# Patient Record
Sex: MALE | Race: OTHER
[De-identification: names, ages, dates, MRNs, and addresses within clinical notes are randomized per-mention and may not be internally consistent; named-entity substitution may affect disease eponyms.]

---

## 2018-06-27 ENCOUNTER — HOSPITAL ENCOUNTER (INPATIENT)
Dept: HOSPITAL 74 - YASAS | Age: 53
LOS: 4 days | Discharge: HOME | End: 2018-07-01
Attending: SURGERY | Admitting: SURGERY
Payer: COMMERCIAL

## 2018-06-27 VITALS — BODY MASS INDEX: 21.5 KG/M2

## 2018-06-27 DIAGNOSIS — F10.230: Primary | ICD-10-CM

## 2018-06-27 DIAGNOSIS — N40.0: ICD-10-CM

## 2018-06-27 DIAGNOSIS — F32.9: ICD-10-CM

## 2018-06-27 DIAGNOSIS — E78.5: ICD-10-CM

## 2018-06-27 DIAGNOSIS — F17.213: ICD-10-CM

## 2018-06-27 DIAGNOSIS — K21.9: ICD-10-CM

## 2018-06-27 LAB
APPEARANCE UR: CLEAR
BILIRUB UR STRIP.AUTO-MCNC: NEGATIVE MG/DL
COLOR UR: COLORLESS
KETONES UR QL STRIP: NEGATIVE
LEUKOCYTE ESTERASE UR QL STRIP.AUTO: NEGATIVE
NITRITE UR QL STRIP: NEGATIVE
PH UR: 7 [PH] (ref 5–8)
PROT UR QL STRIP: NEGATIVE
PROT UR QL STRIP: NEGATIVE
SP GR UR: 1 (ref 1–1.03)
UROBILINOGEN UR STRIP-MCNC: NEGATIVE MG/DL (ref 0.2–1)

## 2018-06-27 PROCEDURE — HZ2ZZZZ DETOXIFICATION SERVICES FOR SUBSTANCE ABUSE TREATMENT: ICD-10-PCS | Performed by: SURGERY

## 2018-06-27 RX ADMIN — Medication SCH MG: at 22:31

## 2018-06-27 RX ADMIN — Medication PRN MG: at 22:31

## 2018-06-27 NOTE — HP
CIWA Score





- CIWA Score


Nausea/Vomitin-Mild Nausea/No Vomiting


Muscle Tremors: 3


Anxiety: 2


Agitation: 2


Paroxysmal Sweats: 3


Orientation: 0-Oriented


Tacttile Disturbances: 0-None


Auditory Disturbances: 0-None


Visual Disturbances: 0-None


Headache: 2-Mild


CIWA-Ar Total Score: 13





Admission ROS [facility]





- HPI


Chief Complaint: 





" I need help but I went sick when I do it by myself"


alcohol withdrawal symptoms 


Allergies/Adverse Reactions: 


 Allergies











Allergy/AdvReac Type Severity Reaction Status Date / Time


 


No Known Allergies Allergy   Verified 18 16:35











History of Present Illness: 





51 yo male with hx of nicotine and alcohol dependence is here seeking detox for 

the first time, denies any other prior hx of treatment. Reports hx of delirium 

tremors and hallucinations when with withdrawing from alcohol, reports hx of 

frequent blackouts with last episode two days ago. PMHX: BPH, GERD, 

hyperlipidemia, depression. Denies suicidal / homicidal ideation. Reports no 

significant period of sobriety. 


Exam Limitations: No Limitations





- Ebola screening


Have you traveled outside of the country in the last 21 days: No


Have you had contact with anyone from an Ebola affected area: No


Have you been sick,other than usual withdrawal symptoms: No


Do you have a fever: No





- Review of Systems


Constitutional: Chills, Loss of Appetite, Unintentional Wgt. Loss (5- 6 lbs)


EENT: reports: Cataracts (left eye sx), Dental Problems (missing teeth)


Respiratory: reports: No Symptoms reported


Cardiac: reports: No Symptoms Reported


GI: reports: Nausea, Poor Appetite, Poor Fluid Intake


: reports: See HPI


Musculoskeletal: reports: No Symptoms Reported


Integumentary: reports: No Symptoms Reported


Neuro: reports: See HPI, Weakness


Endocrine: reports: Increased Thirst


Hematology: reports: No Symptoms Reported


Psychiatric: reports: Orientated x3, Depressed


Other Systems: Reviewed and Negative





Patient History





- Patient Medical History


Hx Anemia: No


Hx Asthma: No


Hx Chronic Obstructive Pulmonary Disease (COPD): No


Hx Cancer: No


Hx Cardiac Disorders: No


Hx Congestive Heart Failure: No


Hx Hypertension: No


Hx Hypercholesterolemia: Yes


Hx Pacemaker: No


Hx Seizures: No


Hx Dementia: No


Hx Diabetes: No


Hx Gastrointestinal Disorders: Yes (GERD)


Hx Liver Disease: No


Hx Genitourinary Disorders: Yes (BPH)


Hx Sexually Transmitted Disorders: No


Hx Renal Disease (ESRD): No


Hx Thyroid Disease: No


Hx Human Immunodeficiency Virus (HIV): No (last tested 1 year)


Hx Hepatitis C: No


Hx Depression: Yes


Hx Suicide Attempt: No


Hx Bipolar Disorder: No


Hx Schizophrenia: No





- Patient Surgical History


Past Surgical History: Yes


Hx Neurologic Surgery: No


Hx Cataract Extraction: No


Hx Cardiac Surgery: No


Hx Lung Surgery: No


Hx Breast Surgery: No


Hx Breast Biopsy: No


Hx Abdominal Surgery: Yes (exploratory sx )


Hx Appendectomy: No


Hx Cholecystectomy: No


Hx Genitourinary Surgery: No


Hx Orthopedic Surgery: No


Anesthesia Reaction: No





- PPD History


Previous Implant?: No


Documented Results: Negative w/o proof


PPD to be Administered?: Yes





- Smoking Cessation


Smoking history: Current every day smoker


Have you smoked in the past 12 months: Yes


Aproximately how many cigarettes per day: 10


Hx Chewing Tobacco Use: No


Initiated information on smoking cessation: Yes


'Breaking Loose' booklet given: 18





- Substance & Tx. History


Hx Alcohol Use: Yes


Hx Substance Use: Yes


Substance Use Type: Alcohol


Hx Substance Use Treatment: No





- Substances Abused


  ** Alcohol


Route: Oral


Frequency: Daily


Amount used: 3 pints of Vodka 


Age of first use: 9


Date of Last Use: 17





Family Disease History





- Family Disease History


Family Disease History: Other: Father (, alzheimers ), Mother (alive, 

Osteoporosis, COPD )





Admission Physical Exam [facility]





- Vital Signs


Vital Signs: 


 Vital Signs - 24 hr











  18





  13:27


 


Temperature 98.2 F


 


Pulse Rate 84


 


Respiratory 20





Rate 


 


Blood Pressure 123/80














- Physical


General Appearance: Yes: Disheveled, Mild Distress, Thin, Tremorous, Sweating, 

Anxious


HEENTM: Yes: EOMI, Hearing grossly Normal, Normal ENT Inspection, Pharynx Normal

, Tm's normal


Respiratory: Yes: Chest Non-Tender, Lungs Clear, Normal Breath Sounds, No 

Respiratory Distress, No Accessory Muscle Use


Neck: Yes: Within Normal Limits


Breast: Yes: Breast Exam Deferred


Cardiology: Yes: Regular Rhythm, Regular Rate


Abdominal: Yes: Normal Bowel Sounds, Non Tender, Flat


Genitourinary: Yes: Within Normal Limits


Back: Yes: Normal Inspection


Musculoskeletal: Yes: full range of Motion, Gait Steady, Pelvis Stable


Extremities: Yes: Normal Capillary Refill, Normal Inspection, Normal Range of 

Motion, Non-Tender


Neurological: Yes: CNs II-XII NML intact, Fully Oriented, Alert, Motor Strength 

5/5, Depressed Affect


Integumentary: Yes: Normal Color, Dry, Warm, Diaphoresis


Lymphatic: Yes: Within Normal Limits





- Diagnostic


(1) Alcohol dependence with withdrawal


Current Visit: Yes   Status: Acute   


Qualifiers: 


   Complication of substance-induced condition: uncomplicated   Qualified Code(s

): F10.230 - Alcohol dependence with withdrawal, uncomplicated   





(2) BPH (benign prostatic hyperplasia)


Current Visit: Yes   Status: Chronic   





(3) Hyperlipidemia


Current Visit: Yes   Status: Chronic   





(4) Depressed mood


Current Visit: Yes   Status: Acute   





(5) GERD (gastroesophageal reflux disease)


Current Visit: Yes   Status: Chronic   


Qualifiers: 


   Esophagitis presence: esophagitis presence not specified   Qualified Code(s)

: K21.9 - Gastro-esophageal reflux disease without esophagitis   





Cleared for Admission S





- Detox or Rehab


[facility] Level of Care: Medically Managed


Detox Regimen/Protocol: Librium





BHS Breath Alcohol Content


Breath Alcohol Content: 0.108





Urine Drug Screen





- Results


Drug Screen Negative: Yes

## 2018-06-28 LAB
ALBUMIN SERPL-MCNC: 3.7 G/DL (ref 3.4–5)
ALP SERPL-CCNC: 94 U/L (ref 45–117)
ALT SERPL-CCNC: 42 U/L (ref 12–78)
ANION GAP SERPL CALC-SCNC: 7 MMOL/L (ref 8–16)
AST SERPL-CCNC: 35 U/L (ref 15–37)
BILIRUB SERPL-MCNC: 0.8 MG/DL (ref 0.2–1)
BUN SERPL-MCNC: 10 MG/DL (ref 7–18)
CALCIUM SERPL-MCNC: 8.8 MG/DL (ref 8.5–10.1)
CHLORIDE SERPL-SCNC: 105 MMOL/L (ref 98–107)
CO2 SERPL-SCNC: 29 MMOL/L (ref 21–32)
CREAT SERPL-MCNC: 0.9 MG/DL (ref 0.7–1.3)
DEPRECATED RDW RBC AUTO: 13.9 % (ref 11.9–15.9)
GLUCOSE SERPL-MCNC: 93 MG/DL (ref 74–106)
HCT VFR BLD CALC: 43.4 % (ref 35.4–49)
HGB BLD-MCNC: 14.1 GM/DL (ref 11.7–16.9)
MCH RBC QN AUTO: 29.4 PG (ref 25.7–33.7)
MCHC RBC AUTO-ENTMCNC: 32.4 G/DL (ref 32–35.9)
MCV RBC: 90.6 FL (ref 80–96)
PLATELET # BLD AUTO: 229 K/MM3 (ref 134–434)
PMV BLD: 10.3 FL (ref 7.5–11.1)
POTASSIUM SERPLBLD-SCNC: 4.1 MMOL/L (ref 3.5–5.1)
PROT SERPL-MCNC: 6.3 G/DL (ref 6.4–8.2)
RBC # BLD AUTO: 4.79 M/MM3 (ref 4–5.6)
SODIUM SERPL-SCNC: 141 MMOL/L (ref 136–145)
WBC # BLD AUTO: 10.1 K/MM3 (ref 4–10)

## 2018-06-28 RX ADMIN — IBUPROFEN PRN MG: 400 TABLET, FILM COATED ORAL at 10:35

## 2018-06-28 RX ADMIN — NICOTINE SCH MG: 14 PATCH, EXTENDED RELEASE TRANSDERMAL at 10:39

## 2018-06-28 RX ADMIN — Medication SCH TAB: at 10:35

## 2018-06-28 RX ADMIN — Medication PRN MG: at 22:11

## 2018-06-28 RX ADMIN — TAMSULOSIN HYDROCHLORIDE SCH MG: 0.4 CAPSULE ORAL at 10:35

## 2018-06-28 RX ADMIN — Medication SCH MG: at 22:11

## 2018-06-28 RX ADMIN — PANTOPRAZOLE SODIUM SCH MG: 20 TABLET, DELAYED RELEASE ORAL at 10:35

## 2018-06-28 RX ADMIN — ATORVASTATIN CALCIUM SCH MG: 40 TABLET, FILM COATED ORAL at 22:11

## 2018-06-28 NOTE — CONSULT
[facility]HS Psychiatric Consult





- Data


Date of interview: 06/28/18


Admission source: [facility]


Identifying data: This is 52 years old French speaking male,  father of 

three, unemployed, homeless, with no psychiatric hospitalization history,  with 

hx of nicotine and alcohol dependence is here seeking detox for the first time.

  Denies suicidal and homicidal history.


Substance Abuse History: Smoking history: Current every day smoker.  Have you 

smoked in the past 12 months: Yes.  Aproximately how many cigarettes per day: 

10.  Hx Chewing Tobacco Use: No.  Initiated information on smoking cessation: 

Yes.  'Breaking Loose' booklet given: 06/27/18.  - Substance & Tx. History.  Hx 

Alcohol Use: Yes.  Hx Substance Use: Yes.  Substance Use Type: Alcohol.  Hx 

Substance Use Treatment: No.  - Substances Abused.  ** Alcohol.  Route: Oral.  

Frequency: Daily.  Amount used: 3 pints of Vodka.  Age of first use: 9.  Date 

of Last Use: 06/27/18


Medical History: BPH, GERD, Hyperlipidemia


Psychiatric History: Patient reports history of depression, reports no 

medicationms taking prior to admikssion


Physical/Sexual Abuse/Trauma History: Denies


Additional Comment: Observation.  Detox Unit Care Protocol





Mental Status Exam





- Mental Status Exam


Alert and Oriented to: Person


Cognitive Function: Fair


Patient Appearance: Well Groomed


Mood: Anxious


Affect: Mood Congruent


Patient Behavior: Talkative


Speech Pattern: Appropriate


Voice Loudness: Normal


Thought Process: Goal Oriented


Thought Disorder: Being Controlled


Hallucinations: Denies


Suicidal Ideation: Denies


Homicidal Ideation: Denies


Insight/Judgement: Fair


Sleep: Difficulty falling asleep


Appetite: Weight loss


Muscle strength/Tone: Normal


Gait/Station: Normal


Additional Comments: Observation.  Detox Unit Care Protocol





Psychiatric Findings





- Problem List (Axis 1, 2,3)


(1) Nicotine dependence


Current Visit: Yes   Status: Acute   





(2) Alcohol dependence with withdrawal


Current Visit: Yes   Status: Acute   


Qualifiers: 


   Complication of substance-induced condition: uncomplicated   Qualified Code(s

): F10.230 - Alcohol dependence with withdrawal, uncomplicated   





(3) BPH (benign prostatic hyperplasia)


Current Visit: Yes   Status: Chronic   





(4) GERD (gastroesophageal reflux disease)


Current Visit: Yes   Status: Chronic   


Qualifiers: 


   Esophagitis presence: esophagitis presence not specified   Qualified Code(s)

: K21.9 - Gastro-esophageal reflux disease without esophagitis   





(5) Hyperlipidemia


Current Visit: Yes   Status: Chronic   





- Initial Treatment Plan


Initial Treatment Plan: Observation.  Detox Unit Care Protocol

## 2018-06-28 NOTE — PN
BHS CIWA





- CIWA Score


Nausea/Vomitin-Mild Nausea/No Vomiting


Muscle Tremors: 3


Anxiety: 2


Agitation: 2


Paroxysmal Sweats: 1-Minimal Palms Moist


Orientation: 0-Oriented


Tacttile Disturbances: 0-None


Auditory Disturbances: 0-None


Visual Disturbances: 0-None


Headache: 3-Moderate


CIWA-Ar Total Score: 12





BHS Progress Note (SOAP)


Subjective: 





sweat tremor headache gi distress trouble sleeping


Objective: 





18 10:14


 Vital Signs











Temperature  97.7 F   18 10:14


 


Pulse Rate  70   18 10:14


 


Respiratory Rate  18   18 10:14


 


Blood Pressure  133/83   18 10:14


 


O2 Sat by Pulse Oximetry (%)      








 Laboratory Last Values











Urine Color  Colorless   18  Unknown


 


Urine Appearance  Clear   18  Unknown


 


Urine pH  7.0  (5.0-8.0)   18  Unknown


 


Ur Specific Gravity  1.002  (1.001-1.035)   18  Unknown


 


Urine Protein  Negative  (NEGATIVE)   18  Unknown


 


Urine Glucose (UA)  Negative  (NEGATIVE)   18  Unknown


 


Urine Ketones  Negative  (NEGATIVE)   18  Unknown


 


Urine Blood  Negative  (NEGATIVE)   18  Unknown


 


Urine Nitrite  Negative  (NEGATIVE)   18  Unknown


 


Urine Bilirubin  Negative  (<2.0 mg/dL)   18  Unknown


 


Urine Urobilinogen  Negative mg/dL (0.2-1.0)   18  Unknown


 


Ur Leukocyte Esterase  Negative  (NEGATIVE)   18  Unknown








lab noted


Assessment: 





18 10:15


withdrawal sx


Plan: 





continue detox

## 2018-06-28 NOTE — EKG
Test Reason : 

Blood Pressure : ***/*** mmHG

Vent. Rate : 070 BPM     Atrial Rate : 070 BPM

   P-R Int : 144 ms          QRS Dur : 086 ms

    QT Int : 418 ms       P-R-T Axes : 061 045 033 degrees

   QTc Int : 451 ms

 

NORMAL SINUS RHYTHM

NORMAL ECG

NO PREVIOUS ECGS AVAILABLE

Confirmed by YAMINI CLEMENTE MD ([id number]) on 6/28/2018 4:22:20 PM

 

Referred By:             Confirmed By:YAMINI CLEMENTE MD

## 2018-06-29 RX ADMIN — Medication SCH TAB: at 10:10

## 2018-06-29 RX ADMIN — Medication SCH MG: at 22:20

## 2018-06-29 RX ADMIN — TAMSULOSIN HYDROCHLORIDE SCH MG: 0.4 CAPSULE ORAL at 09:27

## 2018-06-29 RX ADMIN — Medication PRN MG: at 22:21

## 2018-06-29 RX ADMIN — NICOTINE SCH: 14 PATCH, EXTENDED RELEASE TRANSDERMAL at 11:19

## 2018-06-29 RX ADMIN — IBUPROFEN PRN MG: 400 TABLET, FILM COATED ORAL at 17:34

## 2018-06-29 RX ADMIN — ATORVASTATIN CALCIUM SCH MG: 40 TABLET, FILM COATED ORAL at 22:20

## 2018-06-29 RX ADMIN — PANTOPRAZOLE SODIUM SCH MG: 20 TABLET, DELAYED RELEASE ORAL at 10:10

## 2018-06-29 NOTE — PN
BHS CIWA





- CIWA Score


Muscle Tremors: None


Anxiety: 0-No Anxiety, at Ease


Agitation: 0-Normal Activity


Paroxysmal Sweats: No Perspiration


Orientation: 0-Oriented


Tacttile Disturbances: 0-None


Auditory Disturbances: 0-None

## 2018-06-29 NOTE — PN
BHS Progress Note (SOAP)


Subjective: 





pt states feeling fine today. No complaints


Objective: 





06/29/18 11:08


 Vital Signs - 8 hr











  06/29/18 06/29/18 06/29/18





  03:30 07:00 10:15


 


Temperature  97.3 F L 98.1 F


 


Pulse Rate  59 L 80


 


Respiratory 18 18 16





Rate   


 


Blood Pressure  137/84 126/80








CBC,CMP











WBC  10.1 K/mm3 (4.0-10.0)  H  06/28/18  07:00    


 


RBC  4.79 M/mm3 (4.00-5.60)   06/28/18  07:00    


 


Hgb  14.1 GM/dL (11.7-16.9)   06/28/18  07:00    


 


Hct  43.4 % (35.4-49)   06/28/18  07:00    


 


MCV  90.6 fl (80-96)   06/28/18  07:00    


 


MCH  29.4 pg (25.7-33.7)   06/28/18  07:00    


 


MCHC  32.4 g/dl (32.0-35.9)   06/28/18  07:00    


 


RDW  13.9 % (11.9-15.9)   06/28/18  07:00    


 


Plt Count  229 K/MM3 (134-434)   06/28/18  07:00    


 


MPV  10.3 fl (7.5-11.1)   06/28/18  07:00    


 


Sodium  141 mmol/L (136-145)   06/28/18  07:00    


 


Potassium  4.1 mmol/L (3.5-5.1)   06/28/18  07:00    


 


Chloride  105 mmol/L ()   06/28/18  07:00    


 


Carbon Dioxide  29 mmol/L (21-32)   06/28/18  07:00    


 


Anion Gap  7  (8-16)  L  06/28/18  07:00    


 


BUN  10 mg/dL (7-18)   06/28/18  07:00    


 


Creatinine  0.9 mg/dL (0.7-1.3)   06/28/18  07:00    


 


Creat Clearance w eGFR  > 60  (>60)   06/28/18  07:00    


 


Random Glucose  93 mg/dL ()   06/28/18  07:00    


 


Calcium  8.8 mg/dL (8.5-10.1)   06/28/18  07:00    


 


Total Bilirubin  0.8 mg/dL (0.2-1.0)   06/28/18  07:00    


 


AST  35 U/L (15-37)   06/28/18  07:00    


 


ALT  42 U/L (12-78)   06/28/18  07:00    


 


Alkaline Phosphatase  94 U/L ()   06/28/18  07:00    


 


Total Protein  6.3 g/dl (6.4-8.2)  L  06/28/18  07:00    


 


Albumin  3.7 g/dl (3.4-5.0)   06/28/18  07:00    








grossly nl PE


Assessment: 





06/29/18 11:08


AUD: doing well 


06/29/18 11:09





Plan: 





continue detox protocol

## 2018-06-30 RX ADMIN — Medication SCH MG: at 22:03

## 2018-06-30 RX ADMIN — NICOTINE SCH MG: 14 PATCH, EXTENDED RELEASE TRANSDERMAL at 10:11

## 2018-06-30 RX ADMIN — Medication SCH TAB: at 10:10

## 2018-06-30 RX ADMIN — ATORVASTATIN CALCIUM SCH MG: 40 TABLET, FILM COATED ORAL at 22:03

## 2018-06-30 RX ADMIN — TAMSULOSIN HYDROCHLORIDE SCH MG: 0.4 CAPSULE ORAL at 10:10

## 2018-06-30 RX ADMIN — PANTOPRAZOLE SODIUM SCH MG: 20 TABLET, DELAYED RELEASE ORAL at 10:10

## 2018-06-30 RX ADMIN — Medication PRN MG: at 22:03

## 2018-06-30 NOTE — PN
BHS Progress Note (SOAP)


Subjective: 


 sweats


shakes


sleep disturbance








Objective: 





06/30/18 20:53


A & O x 3


Not in acute distress


Assessment: 





06/30/18 20:53


withdrawal sx


Plan: 





continue detox

## 2018-07-01 VITALS — HEART RATE: 92 BPM | SYSTOLIC BLOOD PRESSURE: 129 MMHG | TEMPERATURE: 97.2 F | DIASTOLIC BLOOD PRESSURE: 89 MMHG

## 2018-07-01 RX ADMIN — NICOTINE SCH: 14 PATCH, EXTENDED RELEASE TRANSDERMAL at 09:09

## 2018-07-01 RX ADMIN — TAMSULOSIN HYDROCHLORIDE SCH MG: 0.4 CAPSULE ORAL at 09:08

## 2018-07-01 RX ADMIN — PANTOPRAZOLE SODIUM SCH MG: 20 TABLET, DELAYED RELEASE ORAL at 09:08

## 2018-07-01 RX ADMIN — Medication SCH TAB: at 09:08

## 2018-07-01 NOTE — DS
BHS Detox Discharge Summary


Admission Date: 


06/27/18





Discharge Date: 07/01/18





- History


Present History: Alcohol Dependence


Additional Comments: 





52 years old male admitted on 6/27/18 for alcohol withdrawal sx


completed alcohol detox regimen tolerated well denies alcohol withdrawal sx


alert oriented x 3 no acute distress


aftercare according to the note counselor arranged  from [facility] to 

[facility] on 7/2/18


patient wants to "go home"  strong recommend return to [facility] on 7/2/18 for 

[facility] 





- Physical Exam Results


Vital Signs: 


 Vital Signs











Temperature  97.0 F L  07/01/18 06:00


 


Pulse Rate  78   07/01/18 06:00


 


Respiratory Rate  18   07/01/18 06:00


 


Blood Pressure  117/66   07/01/18 06:00


 


O2 Sat by Pulse Oximetry (%)      











Pertinent Admission Physical Exam Findings: 





alcohol withdrawal sx


 Vital Signs











Temperature  97.0 F L  07/01/18 06:00


 


Pulse Rate  78   07/01/18 06:00


 


Respiratory Rate  18   07/01/18 06:00


 


Blood Pressure  117/66   07/01/18 06:00


 


O2 Sat by Pulse Oximetry (%)      








 Laboratory Last Values











WBC  10.1 K/mm3 (4.0-10.0)  H  06/28/18  07:00    


 


RBC  4.79 M/mm3 (4.00-5.60)   06/28/18  07:00    


 


Hgb  14.1 GM/dL (11.7-16.9)   06/28/18  07:00    


 


Hct  43.4 % (35.4-49)   06/28/18  07:00    


 


MCV  90.6 fl (80-96)   06/28/18  07:00    


 


MCH  29.4 pg (25.7-33.7)   06/28/18  07:00    


 


MCHC  32.4 g/dl (32.0-35.9)   06/28/18  07:00    


 


RDW  13.9 % (11.9-15.9)   06/28/18  07:00    


 


Plt Count  229 K/MM3 (134-434)   06/28/18  07:00    


 


MPV  10.3 fl (7.5-11.1)   06/28/18  07:00    


 


Sodium  141 mmol/L (136-145)   06/28/18  07:00    


 


Potassium  4.1 mmol/L (3.5-5.1)   06/28/18  07:00    


 


Chloride  105 mmol/L ()   06/28/18  07:00    


 


Carbon Dioxide  29 mmol/L (21-32)   06/28/18  07:00    


 


Anion Gap  7  (8-16)  L  06/28/18  07:00    


 


BUN  10 mg/dL (7-18)   06/28/18  07:00    


 


Creatinine  0.9 mg/dL (0.7-1.3)   06/28/18  07:00    


 


Creat Clearance w eGFR  > 60  (>60)   06/28/18  07:00    


 


Random Glucose  93 mg/dL ()   06/28/18  07:00    


 


Calcium  8.8 mg/dL (8.5-10.1)   06/28/18  07:00    


 


Total Bilirubin  0.8 mg/dL (0.2-1.0)   06/28/18  07:00    


 


AST  35 U/L (15-37)   06/28/18  07:00    


 


ALT  42 U/L (12-78)   06/28/18  07:00    


 


Alkaline Phosphatase  94 U/L ()   06/28/18  07:00    


 


Total Protein  6.3 g/dl (6.4-8.2)  L  06/28/18  07:00    


 


Albumin  3.7 g/dl (3.4-5.0)   06/28/18  07:00    


 


Urine Color  Colorless   06/27/18  Unknown


 


Urine Appearance  Clear   06/27/18  Unknown


 


Urine pH  7.0  (5.0-8.0)   06/27/18  Unknown


 


Ur Specific Gravity  1.002  (1.001-1.035)   06/27/18  Unknown


 


Urine Protein  Negative  (NEGATIVE)   06/27/18  Unknown


 


Urine Glucose (UA)  Negative  (NEGATIVE)   06/27/18  Unknown


 


Urine Ketones  Negative  (NEGATIVE)   06/27/18  Unknown


 


Urine Blood  Negative  (NEGATIVE)   06/27/18  Unknown


 


Urine Nitrite  Negative  (NEGATIVE)   06/27/18  Unknown


 


Urine Bilirubin  Negative  (<2.0 mg/dL)   06/27/18  Unknown


 


Urine Urobilinogen  Negative mg/dL (0.2-1.0)   06/27/18  Unknown


 


Ur Leukocyte Esterase  Negative  (NEGATIVE)   06/27/18  Unknown


 


RPR Titer  Nonreactive  (NONREACTIVE)   06/28/18  07:00    


 


HIV 1&2 Antibody Screen  Negative   06/28/18  07:00    


 


HIV P24 Antigen  Negative   06/28/18  07:00    








lab noted





- Treatment


Hospital Course: Detox Protocol Followed, Detoxed Safely, Responded well, 

Discharged Condition Good, Rehab Referral Accepted


Patient has Accepted a Rehab Referral to: [facility]





- Medication


Discharge Medications: 


Ambulatory Orders





Omeprazole 20 mg PO DAILY 06/27/18 


Atorvastatin Calcium 40 mg PO DAILY #30 tablet 07/01/18 


Tamsulosin HCl [Flomax -] 0.4 mg PO DAILY #30 cap.er.24h 07/01/18 











- Diagnosis


(1) Alcohol dependence with withdrawal


Current Visit: Yes   Status: Acute   


Qualifiers: 


   Complication of substance-induced condition: uncomplicated   Qualified Code(s

): F10.230 - Alcohol dependence with withdrawal, uncomplicated   





(2) BPH (benign prostatic hyperplasia)


Current Visit: Yes   Status: Chronic   


Qualifiers: 


   Lower urinary tract symptom presence: symptoms absent   Qualified Code(s): 

N40.0 - Benign prostatic hyperplasia without lower urinary tract symptoms   





(3) GERD (gastroesophageal reflux disease)


Current Visit: Yes   Status: Chronic   


Qualifiers: 


   Esophagitis presence: without esophagitis   Qualified Code(s): K21.9 - Gastro

-esophageal reflux disease without esophagitis   





(4) Nicotine dependence


Current Visit: Yes   Status: Acute   


Qualifiers: 


   Nicotine product type: cigarettes   Substance use status: in withdrawal   

Qualified Code(s): F17.213 - Nicotine dependence, cigarettes, with withdrawal   





- AMA


Did Patient Leave Against Medical Advice: No